# Patient Record
(demographics unavailable — no encounter records)

---

## 2024-11-22 NOTE — HISTORY OF PRESENT ILLNESS
[de-identified] : ORIGINAL PRESENTATION; Mr. Park is a 29 year old male who presents to the office accompanied by his wife for evaluation of lower back pain that has been occurring for the past month. He denies any injury and states that the pain was the worst it has ever been this am which prompted him to come in and be evaluated.  He states he could hardly get out of bed stating the pain is radiating down his R leg with associated parasthesias but does not go past the R knee.  He has been undergoing chiropractic treatments with mild improvement and recently went to urgent care for evaluation.  He was given Naproxen 500 mg which is providing him with some improvement but states the pain is worse with sitting.  TODAY: I had the pleasure of seeing Mr. Park today in follow up.  His previous history physical findings have been reviewed.  He is under our care for lumbar pain with radiculopathy which she is receiving continue active treatment for.  He states he is much improved since his initial encounter as he has been attending physical therapy for the past 4 to 6 weeks.  He is also use naproxen 500 mg twice daily as needed and states the radicular component almost never occurs.  When he does have pain it is short-lived and he states his range of motion and overall function is also improved.  He wishes to continue to attend physical therapy at this time and hold off with respect to any other treatment or testing options and we are agreeable as well.

## 2024-11-22 NOTE — IMAGING
[de-identified] : lumbar spine:  5/5 strength, 2+ reflexes, + mild tenderness R lumbar paraspinals, full ROM with flexion, NV intact, mildly positive SLR on the R at 40 degrees.

## 2024-11-22 NOTE — ASSESSMENT
[FreeTextEntry1] : Mr. Park is a 29 year old male with lumbar pain with radiculopathy.  He will continue to attend physical therapy and follow-up in 3 to 4 weeks for reassessment.  I have also refilled his naproxen 500 mg twice daily as needed I we will hold off on an MRI at this point time.  He is aware if there is any issue in contacting the office and he verbalized understanding and agreement.

## 2024-12-13 NOTE — IMAGING
[de-identified] : lumbar spine:  5/5 strength, 2+ reflexes, + mild tenderness R lumbar paraspinals, full ROM with flexion, NV intact, mildly positive SLR on the R at 40 degrees.

## 2024-12-13 NOTE — HISTORY OF PRESENT ILLNESS
[de-identified] : ORIGINAL PRESENTATION; Mr. Park is a 29 year old male who presents to the office accompanied by his wife for evaluation of lower back pain that has been occurring for the past month. He denies any injury and states that the pain was the worst it has ever been this am which prompted him to come in and be evaluated.  He states he could hardly get out of bed stating the pain is radiating down his R leg with associated parasthesias but does not go past the R knee.  He has been undergoing chiropractic treatments with mild improvement and recently went to urgent care for evaluation.  He was given Naproxen 500 mg which is providing him with some improvement but states the pain is worse with sitting.  TODAY: I had the pleasure of seeing Mr. Park today in follow up.  His previous history physical findings have been reviewed.  He is under our care for lumbar pain with radiculopathy which she is receiving continue active treatment for.  He continues to attend physical therapy which she does states is helpful however he states when he does stop it the pain returns.  He he did work a lot the past 2 weeks and noticed an increase in his pain.  He states that he felt it radiating into his right lower extremity with associated paresthesias despite taking anti-inflammatory medication.  He denies any loss of motion however we did discuss having him undergo an MRI as his pain still continues and the patient more than agreeable to the plan.

## 2024-12-13 NOTE — ASSESSMENT
[FreeTextEntry1] : Mr. Park is a 29 year old male with lumbar pain with radiculopathy.  He will continue to attend physical therapy and I will order an MRI lumbar spine for further evaluation.  He is aware if there is any issue he can contact the office and he verbalized understanding and agreement.

## 2025-01-27 NOTE — IMAGING
[de-identified] : TTP midline spine and paraspinal musculature  Strength                                          Hip flexor   Right: 5/5; Left: 5/5                              Knee extensor     Right: 5/5; Left: 5/5                      Ankle dorsiflexion   Right: 5/5; Left: 5/5                   EHL           Right: 5/5; Left: 5/5                                 Ankle plantarflexion       Right: 5/5; Left: 5/5  Sensation L1   Right: 2/2; Left: 2/2 L2   Right: 2/2; Left: 2/2 L3   Right: 2/2; Left: 2/2 L4   Right: 2/2; Left: 2/2 L5   Right: 2/2; Left: 2/2 S1   Right: 2/2; Left: 2/2  Reflexes Patella   Right: 2+; Left 2+ Achilles   Right: 2+; Left 2+ Clonus  Right: absent; L: absent

## 2025-01-27 NOTE — DATA REVIEWED
[FreeTextEntry1] : I reviewed the patient's MRI of his lumbar spine.  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

## 2025-01-27 NOTE — DISCUSSION/SUMMARY
[de-identified] : 29-year-old male presents to me with lumbar radiculopathy.  Discussed with the patient that he does have some advanced lumbar degeneration for his age.  At this time I think she best managed with physical therapy.  He can follow-up with pain management to discuss epidural steroid injections as well.

## 2025-04-26 NOTE — ASSESSMENT
[FreeTextEntry1] : 29-year-old male for evaluation status post a right ankle fracture following an injury.  Patient reports he injured his right ankle at work.  Ports pain and swelling laterally.  He was seen at [facility] where x-rays were taken which confirmed an acute closed oblique fracture of the distal fibular metaphysis with minimal displacement.  The ankle mortise remains intact.  He was placed in a splint and advised follow-up in orthopedic specialist.  Physical examination of the right ankle: Mild swelling appreciated laterally greater than medially.  No ecchymosis or erythema appreciated.  Skin is intact.  Patient tender to palpation over the ATFL.  Point tenderness over the distal fibula.  No tenderness of the medial malleolus.  No tenderness of the deltoid ligament, PT FL, or CFL ligaments.  Stable to varus and valgus stress.  Able to bear weight and ambulate at this time however experiences pain when doing so.  Sensorimotor intact distally.  Neurovascularly intact.  No significant tenderness of the metatarsals or Lisfranc.  Achilles tendon is palpable and intact.  Negative Ennis's.  Negative Homans' sign.  X-rays of the right ankle: Acute closed minimally displaced oblique fracture of the distal fibular metadiaphysis.  No subluxations or dislocations are appreciated.  Treatment plan is discussed:  X-rays were discussed in depth.  We discussed operative versus nonoperative management.  I recommended anti-inflammatory medication. Patient agrees to taking Advil/Ibuprofen OTC as needed for pain. Benefits discussed. Confirmed no contraindication to NSAIDs.  I recommended patient rest, ice, compress, and elevate the ankle regularly. Encouraged activity modification as tolerable. Encouraged gentle range of motion to avoid stiffness. no gym /sports until follow-up evaluation.  The patient was placed in a right tall Cam walker boot.  Patient instructed to wear the boot at all times past when active and ambulating.  Patient may remove the boot when showering/sleeping.  Patient understands that the first 2 weeks are the worst in regard to pain and swelling. Patient understands that residual pain and swelling may last for up to 6 months-1 year.  All questions and concerns addressed to patient's satisfaction. Patient expresses full understanding of treatment plan. Patient will follow up in 2 to 3 weeks with Dr. Ernst for repeat evaluation and treatment.

## 2025-05-05 NOTE — DISCUSSION/SUMMARY
[de-identified] :   Pt presenting for follow up of their lateral malleolus fracture  which occurred a couple weeks ago. They were seen at the urgent care and referred to me for definitive management. Has been WBAT in assistive device. No pain other than at the lateral malleolus. No medial sided pain. Does not endorse calf pain, chest pain, SOB.     Exam:       Alert/oriented x3.   Mildly TTP at distal aspect of lateral malleolus   No fx instability noted.   No malalignment noted.   Compartments/soft compressible   ROM grossly intact   Neuro intact distally.       Imaging:       3 views of patients ankle ordered and reviewed by me personally   They demonstrate evidence of a nondisplaced fx of the lateral malleolus     No interval displacement.       Ankle joint is congruent.       Plan:       Continue WBAT in boot.  We can treat this injury nonsurgically and initiate fracture care. I will see patient back in 4 weeks for reassessment.

## 2025-06-07 NOTE — HISTORY OF PRESENT ILLNESS
[de-identified] : Patient is here for follow-up of his right ankle.  He is doing better but still has pain limiting his return back to activity.  Localized the pain laterally over the ankle.

## 2025-06-07 NOTE — DISCUSSION/SUMMARY
[de-identified] : He will continue with the physical therapy regimen and work on a home exercise program.  Ice elevation and anti-inflammatories for symptoms.  He will remain out of work and 100% temporarily disabled till I see him back in the office next month.  All questions answered.

## 2025-06-07 NOTE — PHYSICAL EXAM
[de-identified] : His ankle is still somewhat swollen but certainly improved from prior exams.  He is tender over the ATFL.  The ankle does demonstrate some mild instability.  No bony tenderness.  Neurovascular intact.

## 2025-07-09 NOTE — PHYSICAL EXAM
[de-identified] : His ankle is minimally tender.  Full range of motion.  Neurovascular intact distally.

## 2025-07-09 NOTE — DISCUSSION/SUMMARY
[de-identified] : Activity as tolerated.  No restrictions.  I will see her back in 1 month for his final follow-up.  All questions sought and answered.  Continue with physical therapy.  He can return back to work as he see fit.

## 2025-07-09 NOTE — DATA REVIEWED
[FreeTextEntry1] : 3 views of the patient's right ankle ordered and reviewed by me personally.  X-rays demonstrate essentially healed lateral malleolus fracture.

## 2025-07-09 NOTE — HISTORY OF PRESENT ILLNESS
[de-identified] : Patient follow-up of his right ankle.  He is still having very minimal pain.  Localized discomfort to the lateral malleolus alone.  No interval trauma.  Doing physical therapy.